# Patient Record
Sex: MALE | Employment: FULL TIME | URBAN - METROPOLITAN AREA
[De-identification: names, ages, dates, MRNs, and addresses within clinical notes are randomized per-mention and may not be internally consistent; named-entity substitution may affect disease eponyms.]

---

## 2024-04-04 ENCOUNTER — OFFICE VISIT (OUTPATIENT)
Dept: PRIMARY CARE | Facility: CLINIC | Age: 29
End: 2024-04-04
Payer: COMMERCIAL

## 2024-04-04 ENCOUNTER — HOSPITAL ENCOUNTER (OUTPATIENT)
Dept: RADIOLOGY | Facility: HOSPITAL | Age: 29
Discharge: HOME | End: 2024-04-04
Payer: COMMERCIAL

## 2024-04-04 VITALS
WEIGHT: 205 LBS | HEART RATE: 80 BPM | DIASTOLIC BLOOD PRESSURE: 100 MMHG | HEIGHT: 73 IN | BODY MASS INDEX: 27.17 KG/M2 | SYSTOLIC BLOOD PRESSURE: 130 MMHG

## 2024-04-04 DIAGNOSIS — Z76.89 ENCOUNTER TO ESTABLISH CARE: ICD-10-CM

## 2024-04-04 DIAGNOSIS — R06.02 SHORTNESS OF BREATH: Primary | ICD-10-CM

## 2024-04-04 DIAGNOSIS — R07.9 CHEST PAIN, UNSPECIFIED TYPE: ICD-10-CM

## 2024-04-04 DIAGNOSIS — R06.02 SHORTNESS OF BREATH: ICD-10-CM

## 2024-04-04 DIAGNOSIS — R03.0 ELEVATED BLOOD PRESSURE READING: ICD-10-CM

## 2024-04-04 DIAGNOSIS — R51.9 NONINTRACTABLE HEADACHE, UNSPECIFIED CHRONICITY PATTERN, UNSPECIFIED HEADACHE TYPE: ICD-10-CM

## 2024-04-04 PROCEDURE — 93000 ELECTROCARDIOGRAM COMPLETE: CPT | Performed by: CLINICAL NURSE SPECIALIST

## 2024-04-04 PROCEDURE — 99204 OFFICE O/P NEW MOD 45 MIN: CPT | Performed by: CLINICAL NURSE SPECIALIST

## 2024-04-04 PROCEDURE — 71046 X-RAY EXAM CHEST 2 VIEWS: CPT

## 2024-04-04 PROCEDURE — 71046 X-RAY EXAM CHEST 2 VIEWS: CPT | Performed by: RADIOLOGY

## 2024-04-04 PROCEDURE — 1036F TOBACCO NON-USER: CPT | Performed by: CLINICAL NURSE SPECIALIST

## 2024-04-04 ASSESSMENT — PATIENT HEALTH QUESTIONNAIRE - PHQ9
1. LITTLE INTEREST OR PLEASURE IN DOING THINGS: NOT AT ALL
SUM OF ALL RESPONSES TO PHQ9 QUESTIONS 1 AND 2: 0
2. FEELING DOWN, DEPRESSED OR HOPELESS: NOT AT ALL

## 2024-04-04 ASSESSMENT — ENCOUNTER SYMPTOMS
DEPRESSION: 0
EYE PAIN: 0
BACK PAIN: 0
OCCASIONAL FEELINGS OF UNSTEADINESS: 0
FEVER: 0
PHOTOPHOBIA: 0
BRUISES/BLEEDS EASILY: 0
UNEXPECTED WEIGHT CHANGE: 0
VOMITING: 0
CHILLS: 0
POLYDIPSIA: 0
WOUND: 0
PALPITATIONS: 1
FLANK PAIN: 0
WHEEZING: 0
APPETITE CHANGE: 0
DYSURIA: 0
NECK PAIN: 0
DIARRHEA: 0
JOINT SWELLING: 0
HEMATURIA: 0
SORE THROAT: 0
HEADACHES: 0
CHEST TIGHTNESS: 0
DIZZINESS: 0
ABDOMINAL PAIN: 0
ARTHRALGIAS: 0
MYALGIAS: 0
BLOOD IN STOOL: 0
FATIGUE: 0
ACTIVITY CHANGE: 0
CONFUSION: 0
COUGH: 0
LOSS OF SENSATION IN FEET: 0
SLEEP DISTURBANCE: 0
SHORTNESS OF BREATH: 1
NAUSEA: 0
SEIZURES: 0
TROUBLE SWALLOWING: 0
CONSTIPATION: 0

## 2024-04-04 ASSESSMENT — ANXIETY QUESTIONNAIRES
6. BECOMING EASILY ANNOYED OR IRRITABLE: NOT AT ALL
7. FEELING AFRAID AS IF SOMETHING AWFUL MIGHT HAPPEN: NOT AT ALL
GAD7 TOTAL SCORE: 9
3. WORRYING TOO MUCH ABOUT DIFFERENT THINGS: NOT AT ALL
5. BEING SO RESTLESS THAT IT IS HARD TO SIT STILL: NEARLY EVERY DAY
4. TROUBLE RELAXING: NOT AT ALL
1. FEELING NERVOUS, ANXIOUS, OR ON EDGE: NEARLY EVERY DAY
2. NOT BEING ABLE TO STOP OR CONTROL WORRYING: NEARLY EVERY DAY

## 2024-04-04 ASSESSMENT — COLUMBIA-SUICIDE SEVERITY RATING SCALE - C-SSRS
1. IN THE PAST MONTH, HAVE YOU WISHED YOU WERE DEAD OR WISHED YOU COULD GO TO SLEEP AND NOT WAKE UP?: NO
6. HAVE YOU EVER DONE ANYTHING, STARTED TO DO ANYTHING, OR PREPARED TO DO ANYTHING TO END YOUR LIFE?: NO
2. HAVE YOU ACTUALLY HAD ANY THOUGHTS OF KILLING YOURSELF?: NO

## 2024-04-04 NOTE — PROGRESS NOTES
Subjective   Patient ID: Garry Rivas is a 28 y.o. male who presents for Establish Care (New patient/Discuss chest pain on and off, pain left side off head, Dizziness, Shortness of breath ).  HPI    Patient presents as a new patient today today to establish care. Multiple complaints. Complaints of Chest pain that has been intermittent, Head pain, Dizziness and Shortness of breath. All symptoms come and go. Unable to identify any triggers. States that at times he can wake up with these symptoms. Palpitations intermittent. Denies any nausea/vomiting. Denies any recent infections. Denies any concerns for Anxiety.     Review of Systems   Constitutional:  Negative for activity change, appetite change, chills, fatigue, fever and unexpected weight change.   HENT:  Negative for ear pain, hearing loss, nosebleeds, sore throat, tinnitus and trouble swallowing.    Eyes:  Negative for photophobia, pain and visual disturbance.   Respiratory:  Positive for shortness of breath. Negative for cough, chest tightness and wheezing.    Cardiovascular:  Positive for chest pain and palpitations. Negative for leg swelling.   Gastrointestinal:  Negative for abdominal pain, blood in stool, constipation, diarrhea, nausea and vomiting.   Endocrine: Negative for cold intolerance, heat intolerance, polydipsia and polyuria.   Genitourinary:  Negative for dysuria, flank pain and hematuria.   Musculoskeletal:  Negative for arthralgias, back pain, joint swelling, myalgias and neck pain.   Skin:  Negative for pallor, rash and wound.   Allergic/Immunologic: Negative for immunocompromised state.   Neurological:  Negative for dizziness, seizures and headaches.   Hematological:  Does not bruise/bleed easily.   Psychiatric/Behavioral:  Negative for confusion and sleep disturbance.        Objective   Physical Exam  Vitals and nursing note reviewed.   Constitutional:       General: He is not in acute distress.     Appearance: Normal appearance.   HENT:       Head: Normocephalic.      Nose: Nose normal.   Eyes:      Conjunctiva/sclera: Conjunctivae normal.   Neck:      Vascular: No carotid bruit.   Cardiovascular:      Rate and Rhythm: Normal rate and regular rhythm.      Pulses: Normal pulses.      Heart sounds: Normal heart sounds.   Pulmonary:      Effort: Pulmonary effort is normal.      Breath sounds: Normal breath sounds.   Abdominal:      General: Bowel sounds are normal.      Palpations: Abdomen is soft.   Musculoskeletal:         General: Normal range of motion.      Cervical back: Normal range of motion.   Skin:     General: Skin is warm and dry.   Neurological:      Mental Status: He is alert and oriented to person, place, and time. Mental status is at baseline.   Psychiatric:         Mood and Affect: Mood normal.         Behavior: Behavior normal.         Assessment/Plan        Chest Pain, HA, Elevated BP, Shortness of breath: ECG done in the office today. Indicated NSR with HR controlled. Lab work and imaging ordered. Will follow up pending results. Discussed management of elevated blood pressure. Follow up pending results and in 1 month to reassess blood pressure.     LUZMA Harrington-CNS 04/04/24 12:08 PM

## 2024-04-05 ENCOUNTER — TELEPHONE (OUTPATIENT)
Dept: PRIMARY CARE | Facility: CLINIC | Age: 29
End: 2024-04-05
Payer: COMMERCIAL

## 2024-04-05 NOTE — TELEPHONE ENCOUNTER
----- Message from Paola Mcgregor, LUZMA-CNS sent at 4/5/2024  7:34 AM EDT -----  Please let patient know that his XR was clear. Will follow up after results of blood work. Thank you!

## 2024-04-06 ENCOUNTER — LAB (OUTPATIENT)
Dept: LAB | Facility: LAB | Age: 29
End: 2024-04-06
Payer: COMMERCIAL

## 2024-04-06 DIAGNOSIS — R51.9 NONINTRACTABLE HEADACHE, UNSPECIFIED CHRONICITY PATTERN, UNSPECIFIED HEADACHE TYPE: ICD-10-CM

## 2024-04-06 DIAGNOSIS — R07.9 CHEST PAIN, UNSPECIFIED TYPE: ICD-10-CM

## 2024-04-06 DIAGNOSIS — R06.02 SHORTNESS OF BREATH: ICD-10-CM

## 2024-04-06 DIAGNOSIS — R03.0 ELEVATED BLOOD PRESSURE READING: ICD-10-CM

## 2024-04-06 LAB
ALBUMIN SERPL BCP-MCNC: 4.7 G/DL (ref 3.4–5)
ALP SERPL-CCNC: 68 U/L (ref 33–120)
ALT SERPL W P-5'-P-CCNC: 46 U/L (ref 10–52)
ANION GAP SERPL CALC-SCNC: 13 MMOL/L (ref 10–20)
AST SERPL W P-5'-P-CCNC: 30 U/L (ref 9–39)
BILIRUB SERPL-MCNC: 0.8 MG/DL (ref 0–1.2)
BUN SERPL-MCNC: 15 MG/DL (ref 6–23)
CALCIUM SERPL-MCNC: 9.4 MG/DL (ref 8.6–10.3)
CHLORIDE SERPL-SCNC: 104 MMOL/L (ref 98–107)
CHOLEST SERPL-MCNC: 202 MG/DL (ref 0–199)
CHOLESTEROL/HDL RATIO: 2.9
CO2 SERPL-SCNC: 26 MMOL/L (ref 21–32)
CREAT SERPL-MCNC: 0.74 MG/DL (ref 0.5–1.3)
EGFRCR SERPLBLD CKD-EPI 2021: >90 ML/MIN/1.73M*2
ERYTHROCYTE [DISTWIDTH] IN BLOOD BY AUTOMATED COUNT: 12.4 % (ref 11.5–14.5)
GLUCOSE SERPL-MCNC: 95 MG/DL (ref 74–99)
HCT VFR BLD AUTO: 46.5 % (ref 41–52)
HDLC SERPL-MCNC: 69.8 MG/DL
HGB BLD-MCNC: 16.1 G/DL (ref 13.5–17.5)
LDLC SERPL CALC-MCNC: 120 MG/DL
MAGNESIUM SERPL-MCNC: 1.98 MG/DL (ref 1.6–2.4)
MCH RBC QN AUTO: 29.4 PG (ref 26–34)
MCHC RBC AUTO-ENTMCNC: 34.6 G/DL (ref 32–36)
MCV RBC AUTO: 85 FL (ref 80–100)
NON HDL CHOLESTEROL: 132 MG/DL (ref 0–149)
NRBC BLD-RTO: 0 /100 WBCS (ref 0–0)
PLATELET # BLD AUTO: 244 X10*3/UL (ref 150–450)
POTASSIUM SERPL-SCNC: 4.2 MMOL/L (ref 3.5–5.3)
PROT SERPL-MCNC: 6.9 G/DL (ref 6.4–8.2)
RBC # BLD AUTO: 5.47 X10*6/UL (ref 4.5–5.9)
SODIUM SERPL-SCNC: 139 MMOL/L (ref 136–145)
TRIGL SERPL-MCNC: 62 MG/DL (ref 0–149)
TSH SERPL-ACNC: 1.48 MIU/L (ref 0.44–3.98)
VIT B12 SERPL-MCNC: 397 PG/ML (ref 211–911)
VLDL: 12 MG/DL (ref 0–40)
WBC # BLD AUTO: 5.6 X10*3/UL (ref 4.4–11.3)

## 2024-04-06 PROCEDURE — 80061 LIPID PANEL: CPT

## 2024-04-06 PROCEDURE — 84443 ASSAY THYROID STIM HORMONE: CPT

## 2024-04-06 PROCEDURE — 83735 ASSAY OF MAGNESIUM: CPT

## 2024-04-06 PROCEDURE — 80053 COMPREHEN METABOLIC PANEL: CPT

## 2024-04-06 PROCEDURE — 36415 COLL VENOUS BLD VENIPUNCTURE: CPT

## 2024-04-06 PROCEDURE — 85027 COMPLETE CBC AUTOMATED: CPT

## 2024-04-06 PROCEDURE — 82607 VITAMIN B-12: CPT

## 2024-04-08 ENCOUNTER — TELEPHONE (OUTPATIENT)
Dept: PRIMARY CARE | Facility: CLINIC | Age: 29
End: 2024-04-08
Payer: COMMERCIAL

## 2024-04-08 NOTE — TELEPHONE ENCOUNTER
----- Message from LUZMA Harrington-CNS sent at 4/7/2024  9:53 PM EDT -----  Please call patient with lab results. Cholesterol is borderline. Remaining blood work is stable. Would keep follow up appointment and at that time can reevaluate his blood pressure and discuss Holter Monitor as planned. Thank you!

## 2024-05-07 ENCOUNTER — OFFICE VISIT (OUTPATIENT)
Dept: PRIMARY CARE | Facility: CLINIC | Age: 29
End: 2024-05-07
Payer: COMMERCIAL

## 2024-05-07 VITALS
WEIGHT: 205 LBS | DIASTOLIC BLOOD PRESSURE: 100 MMHG | HEART RATE: 89 BPM | HEIGHT: 73 IN | BODY MASS INDEX: 27.17 KG/M2 | SYSTOLIC BLOOD PRESSURE: 130 MMHG

## 2024-05-07 DIAGNOSIS — R07.9 CHEST PAIN, UNSPECIFIED TYPE: ICD-10-CM

## 2024-05-07 DIAGNOSIS — R03.0 ELEVATED BLOOD PRESSURE READING: ICD-10-CM

## 2024-05-07 DIAGNOSIS — I10 PRIMARY HYPERTENSION: Primary | ICD-10-CM

## 2024-05-07 DIAGNOSIS — R51.9 NONINTRACTABLE HEADACHE, UNSPECIFIED CHRONICITY PATTERN, UNSPECIFIED HEADACHE TYPE: ICD-10-CM

## 2024-05-07 DIAGNOSIS — G47.10 HYPERSOMNIA: ICD-10-CM

## 2024-05-07 DIAGNOSIS — E78.2 MIXED HYPERLIPIDEMIA: ICD-10-CM

## 2024-05-07 DIAGNOSIS — R06.02 SHORTNESS OF BREATH: ICD-10-CM

## 2024-05-07 DIAGNOSIS — R06.83 SNORING: ICD-10-CM

## 2024-05-07 PROBLEM — E78.5 HYPERLIPIDEMIA: Status: ACTIVE | Noted: 2024-05-07

## 2024-05-07 PROCEDURE — 99214 OFFICE O/P EST MOD 30 MIN: CPT | Performed by: CLINICAL NURSE SPECIALIST

## 2024-05-07 PROCEDURE — 1036F TOBACCO NON-USER: CPT | Performed by: CLINICAL NURSE SPECIALIST

## 2024-05-07 PROCEDURE — 3080F DIAST BP >= 90 MM HG: CPT | Performed by: CLINICAL NURSE SPECIALIST

## 2024-05-07 PROCEDURE — 3075F SYST BP GE 130 - 139MM HG: CPT | Performed by: CLINICAL NURSE SPECIALIST

## 2024-05-07 RX ORDER — LISINOPRIL 10 MG/1
10 TABLET ORAL DAILY
Qty: 100 TABLET | Refills: 3 | Status: SHIPPED | OUTPATIENT
Start: 2024-05-07 | End: 2025-06-11

## 2024-05-07 ASSESSMENT — ENCOUNTER SYMPTOMS
BACK PAIN: 0
ARTHRALGIAS: 0
DIZZINESS: 0
CONFUSION: 0
FEVER: 0
ACTIVITY CHANGE: 0
BLOOD IN STOOL: 0
SORE THROAT: 0
TROUBLE SWALLOWING: 0
BRUISES/BLEEDS EASILY: 0
MYALGIAS: 0
APPETITE CHANGE: 0
NECK PAIN: 0
CHILLS: 0
ABDOMINAL PAIN: 0
FLANK PAIN: 0
EYE PAIN: 0
VOMITING: 0
SLEEP DISTURBANCE: 1
DEPRESSION: 0
PHOTOPHOBIA: 0
SHORTNESS OF BREATH: 0
WOUND: 0
LOSS OF SENSATION IN FEET: 0
DYSURIA: 0
FATIGUE: 1
CHEST TIGHTNESS: 0
DIARRHEA: 0
POLYDIPSIA: 0
JOINT SWELLING: 0
UNEXPECTED WEIGHT CHANGE: 0
OCCASIONAL FEELINGS OF UNSTEADINESS: 0
WHEEZING: 0
SEIZURES: 0
CONSTIPATION: 0
COUGH: 0
HEADACHES: 0
NAUSEA: 0
HEMATURIA: 0
PALPITATIONS: 0

## 2024-05-07 ASSESSMENT — COLUMBIA-SUICIDE SEVERITY RATING SCALE - C-SSRS
1. IN THE PAST MONTH, HAVE YOU WISHED YOU WERE DEAD OR WISHED YOU COULD GO TO SLEEP AND NOT WAKE UP?: NO
2. HAVE YOU ACTUALLY HAD ANY THOUGHTS OF KILLING YOURSELF?: NO
6. HAVE YOU EVER DONE ANYTHING, STARTED TO DO ANYTHING, OR PREPARED TO DO ANYTHING TO END YOUR LIFE?: NO

## 2024-05-07 NOTE — PROGRESS NOTES
Subjective   Patient ID: Garry Rivas is a 29 y.o. male who presents for Follow-up (Follow up blood pressure ).  HPI    Patient presents as a follow up appointment to discuss his blood pressure. Complaints of Chest pain that have been intermittent, Head pain, Dizziness and Shortness of breath. All symptoms come and go. Unable to identify any triggers. States that at times he can wake up with these symptoms. Palpitations intermittent. Denies any nausea/vomiting. Denies any recent infections. Denies any concerns for Anxiety.     States that since last OV some symptoms have started to improve. Has been able to be more active, Mountain Biking and Skateboarding. Tolerating physical activity. Symptoms are not as severe. Does think that he has some element of Anxiety contributing to his symptoms.     Blood pressure has remained elevated. Lab work and Imaging completed since last visit.     Has also been having increased Snoring. Doesn't feel rested at night. Constantly needs to turn on his side. Significant Other kicks him to roll over. Increased napping. Feels tired/fatigued.     Review of Systems   Constitutional:  Positive for fatigue. Negative for activity change, appetite change, chills, fever and unexpected weight change.   HENT:  Negative for ear pain, hearing loss, nosebleeds, sore throat, tinnitus and trouble swallowing.    Eyes:  Negative for photophobia, pain and visual disturbance.   Respiratory:  Negative for cough, chest tightness, shortness of breath and wheezing.    Cardiovascular:  Negative for chest pain, palpitations and leg swelling.   Gastrointestinal:  Negative for abdominal pain, blood in stool, constipation, diarrhea, nausea and vomiting.   Endocrine: Negative for cold intolerance, heat intolerance, polydipsia and polyuria.   Genitourinary:  Negative for dysuria, flank pain and hematuria.   Musculoskeletal:  Negative for arthralgias, back pain, joint swelling, myalgias and neck pain.   Skin:   Negative for pallor, rash and wound.   Allergic/Immunologic: Negative for immunocompromised state.   Neurological:  Negative for dizziness, seizures and headaches.   Hematological:  Does not bruise/bleed easily.   Psychiatric/Behavioral:  Positive for sleep disturbance. Negative for confusion.        Objective   Physical Exam  Vitals and nursing note reviewed.   Constitutional:       General: He is not in acute distress.     Appearance: Normal appearance.   HENT:      Head: Normocephalic.      Nose: Nose normal.   Eyes:      Conjunctiva/sclera: Conjunctivae normal.   Neck:      Vascular: No carotid bruit.   Cardiovascular:      Rate and Rhythm: Normal rate and regular rhythm.      Pulses: Normal pulses.      Heart sounds: Normal heart sounds.   Pulmonary:      Effort: Pulmonary effort is normal.      Breath sounds: Normal breath sounds.   Abdominal:      General: Bowel sounds are normal.      Palpations: Abdomen is soft.   Musculoskeletal:         General: Normal range of motion.      Cervical back: Normal range of motion.   Skin:     General: Skin is warm and dry.   Neurological:      Mental Status: He is alert and oriented to person, place, and time. Mental status is at baseline.   Psychiatric:         Mood and Affect: Mood normal.         Behavior: Behavior normal.         Assessment/Plan       Reviewed results of blood work completed with patient.      Chest Pain, HA, Shortness of breath: ECG done in the office at last OV Indicated NSR with HR controlled. Lab work and imaging completed.   Hypertension: Start Lisinopril. Follow up with medication effectiveness in 1-2 months.   Snoring, Hypersomnia: HSAT ordered. Will follow up pending results.   Hyperlipidemia: Borderline. Lifestyle changes recommended: Diet consisting of low fat foods, lean meats, high fiber, fresh fruits and vegetables. 150 min/ weekly aerobic exercise.     LUZMA Harrington-CNS 05/07/24 10:17 AM

## 2024-06-04 ENCOUNTER — CLINICAL SUPPORT (OUTPATIENT)
Dept: SLEEP MEDICINE | Facility: HOSPITAL | Age: 29
End: 2024-06-04
Payer: COMMERCIAL

## 2024-06-04 DIAGNOSIS — R06.83 SNORING: ICD-10-CM

## 2024-06-04 DIAGNOSIS — G47.10 HYPERSOMNIA: ICD-10-CM

## 2024-06-04 PROCEDURE — 95806 SLEEP STUDY UNATT&RESP EFFT: CPT | Performed by: INTERNAL MEDICINE

## 2024-06-19 ENCOUNTER — TELEPHONE (OUTPATIENT)
Dept: PRIMARY CARE | Facility: CLINIC | Age: 29
End: 2024-06-19
Payer: COMMERCIAL

## 2024-06-19 NOTE — TELEPHONE ENCOUNTER
----- Message from LUZMA Harrington-CNS sent at 6/18/2024  6:00 PM EDT -----  Please let patient know that Sleep Study indicates Severe Apnea. If agreeable, treatment would be Pap therapy. Or can do a dedicated Sleep Study for Titration. If agreeable, will place the order. Thank you!

## 2024-06-20 NOTE — TELEPHONE ENCOUNTER
He called back I went over the message he stated that he has an apt in 1 week with Paola and he would like to sit and talk with her about this

## 2024-06-26 ENCOUNTER — APPOINTMENT (OUTPATIENT)
Dept: PRIMARY CARE | Facility: CLINIC | Age: 29
End: 2024-06-26
Payer: COMMERCIAL

## 2024-06-26 VITALS
HEART RATE: 88 BPM | HEIGHT: 73 IN | SYSTOLIC BLOOD PRESSURE: 120 MMHG | DIASTOLIC BLOOD PRESSURE: 82 MMHG | BODY MASS INDEX: 26.64 KG/M2 | WEIGHT: 201 LBS

## 2024-06-26 DIAGNOSIS — R06.02 SHORTNESS OF BREATH: ICD-10-CM

## 2024-06-26 DIAGNOSIS — R06.83 SNORING: ICD-10-CM

## 2024-06-26 DIAGNOSIS — I10 PRIMARY HYPERTENSION: ICD-10-CM

## 2024-06-26 DIAGNOSIS — G47.10 HYPERSOMNIA: ICD-10-CM

## 2024-06-26 DIAGNOSIS — G47.33 SEVERE OBSTRUCTIVE SLEEP APNEA: Primary | ICD-10-CM

## 2024-06-26 DIAGNOSIS — E78.2 MIXED HYPERLIPIDEMIA: ICD-10-CM

## 2024-06-26 DIAGNOSIS — R03.0 ELEVATED BLOOD PRESSURE READING: ICD-10-CM

## 2024-06-26 DIAGNOSIS — R07.9 CHEST PAIN, UNSPECIFIED TYPE: ICD-10-CM

## 2024-06-26 DIAGNOSIS — R51.9 NONINTRACTABLE HEADACHE, UNSPECIFIED CHRONICITY PATTERN, UNSPECIFIED HEADACHE TYPE: ICD-10-CM

## 2024-06-26 PROCEDURE — 3074F SYST BP LT 130 MM HG: CPT | Performed by: CLINICAL NURSE SPECIALIST

## 2024-06-26 PROCEDURE — 1036F TOBACCO NON-USER: CPT | Performed by: CLINICAL NURSE SPECIALIST

## 2024-06-26 PROCEDURE — 99214 OFFICE O/P EST MOD 30 MIN: CPT | Performed by: CLINICAL NURSE SPECIALIST

## 2024-06-26 PROCEDURE — 3079F DIAST BP 80-89 MM HG: CPT | Performed by: CLINICAL NURSE SPECIALIST

## 2024-06-26 ASSESSMENT — ENCOUNTER SYMPTOMS
HEMATURIA: 0
SHORTNESS OF BREATH: 0
ARTHRALGIAS: 0
LOSS OF SENSATION IN FEET: 0
FLANK PAIN: 0
NECK PAIN: 0
CONSTIPATION: 0
PALPITATIONS: 0
WOUND: 0
BRUISES/BLEEDS EASILY: 0
DYSURIA: 0
VOMITING: 0
EYE PAIN: 0
TROUBLE SWALLOWING: 0
DEPRESSION: 0
BLOOD IN STOOL: 0
COUGH: 0
POLYDIPSIA: 0
NAUSEA: 0
FEVER: 0
ABDOMINAL PAIN: 0
UNEXPECTED WEIGHT CHANGE: 0
WHEEZING: 0
MYALGIAS: 0
PHOTOPHOBIA: 0
CONFUSION: 0
FATIGUE: 0
DIZZINESS: 0
CHEST TIGHTNESS: 0
APPETITE CHANGE: 0
HEADACHES: 0
SEIZURES: 0
OCCASIONAL FEELINGS OF UNSTEADINESS: 0
SORE THROAT: 0
ACTIVITY CHANGE: 0
BACK PAIN: 0
SLEEP DISTURBANCE: 0
DIARRHEA: 0
JOINT SWELLING: 0
CHILLS: 0

## 2024-06-26 ASSESSMENT — PATIENT HEALTH QUESTIONNAIRE - PHQ9
2. FEELING DOWN, DEPRESSED OR HOPELESS: NOT AT ALL
1. LITTLE INTEREST OR PLEASURE IN DOING THINGS: NOT AT ALL
SUM OF ALL RESPONSES TO PHQ9 QUESTIONS 1 AND 2: 0

## 2024-06-26 NOTE — PROGRESS NOTES
Subjective   Patient ID: Garry Rivas is a 29 y.o. male who presents for Annual Exam (Wellness exam/Discuss bp running high).  HPI    Patient presents as a follow up appointment to discuss his blood pressure. Complaints of Chest pain that have been intermittent, Head pain, Dizziness and Shortness of breath. All symptoms come and go. Unable to identify any triggers. States that at times he can wake up with these symptoms. Palpitations intermittent. Denies any nausea/vomiting. Denies any recent infections. Denies any concerns for Anxiety.      States that since last OV some symptoms have started to improve. Has been able to be more active, Mountain Biking and Skateboarding. Tolerating physical activity. Symptoms are not as severe. Does think that he has some element of Anxiety contributing to his symptoms.      Has also been having increased Snoring. Doesn't feel rested at night. Constantly needs to turn on his side. Significant Other kicks him to roll over. Increased napping. Feels tired/fatigued. Sleep study completed.     Has continued on Lisinopril for his Hypertension.     Review of Systems   Constitutional:  Negative for activity change, appetite change, chills, fatigue, fever and unexpected weight change.   HENT:  Negative for ear pain, hearing loss, nosebleeds, sore throat, tinnitus and trouble swallowing.    Eyes:  Negative for photophobia, pain and visual disturbance.   Respiratory:  Negative for cough, chest tightness, shortness of breath and wheezing.    Cardiovascular:  Negative for chest pain, palpitations and leg swelling.   Gastrointestinal:  Negative for abdominal pain, blood in stool, constipation, diarrhea, nausea and vomiting.   Endocrine: Negative for cold intolerance, heat intolerance, polydipsia and polyuria.   Genitourinary:  Negative for dysuria, flank pain and hematuria.   Musculoskeletal:  Negative for arthralgias, back pain, joint swelling, myalgias and neck pain.   Skin:  Negative for  pallor, rash and wound.   Allergic/Immunologic: Negative for immunocompromised state.   Neurological:  Negative for dizziness, seizures and headaches.   Hematological:  Does not bruise/bleed easily.   Psychiatric/Behavioral:  Negative for confusion and sleep disturbance.        Objective   Physical Exam  Vitals and nursing note reviewed.   Constitutional:       General: He is not in acute distress.     Appearance: Normal appearance.   HENT:      Head: Normocephalic.      Nose: Nose normal.   Eyes:      Conjunctiva/sclera: Conjunctivae normal.   Neck:      Vascular: No carotid bruit.   Cardiovascular:      Rate and Rhythm: Normal rate and regular rhythm.      Pulses: Normal pulses.      Heart sounds: Normal heart sounds.   Pulmonary:      Effort: Pulmonary effort is normal.      Breath sounds: Normal breath sounds.   Abdominal:      General: Bowel sounds are normal.      Palpations: Abdomen is soft.   Musculoskeletal:         General: Normal range of motion.      Cervical back: Normal range of motion.   Skin:     General: Skin is warm and dry.   Neurological:      Mental Status: He is alert and oriented to person, place, and time. Mental status is at baseline.   Psychiatric:         Mood and Affect: Mood normal.         Behavior: Behavior normal.       Assessment/Plan        Reviewed results of blood work completed with patient at last OV.       Chest Pain, HA, Shortness of breath: ECG done in the office at last OV Indicated NSR with HR controlled. Lab work and imaging completed.   Hypertension: Continue Lisinopril.   Apnea: HSAT indicated Severe Apnea.  Discussed treatment management.  Sleep Medicine referral.   Hyperlipidemia: Borderline. Lifestyle changes recommended: Diet consisting of low fat foods, lean meats, high fiber, fresh fruits and vegetables. 150 min/ weekly aerobic exercise. Reevaluate with next lab work.     LUZMA Harrington-CNS 06/26/24 3:10 PM

## 2024-07-02 ENCOUNTER — TELEPHONE (OUTPATIENT)
Dept: PRIMARY CARE | Facility: CLINIC | Age: 29
End: 2024-07-02
Payer: COMMERCIAL

## 2024-07-02 DIAGNOSIS — G47.33 SEVERE OBSTRUCTIVE SLEEP APNEA: Primary | ICD-10-CM

## 2024-07-02 NOTE — TELEPHONE ENCOUNTER
Patient says that he has not be seen by sleep medicine  until late September. Would like the CPAP ordered

## 2024-07-09 ENCOUNTER — APPOINTMENT (OUTPATIENT)
Dept: PRIMARY CARE | Facility: CLINIC | Age: 29
End: 2024-07-09
Payer: COMMERCIAL

## 2024-07-25 ENCOUNTER — TELEPHONE (OUTPATIENT)
Dept: PRIMARY CARE | Facility: CLINIC | Age: 29
End: 2024-07-25
Payer: COMMERCIAL

## 2024-07-25 DIAGNOSIS — I10 PRIMARY HYPERTENSION: ICD-10-CM

## 2024-07-25 RX ORDER — LISINOPRIL 10 MG/1
10 TABLET ORAL DAILY
Qty: 90 TABLET | Refills: 3 | Status: SHIPPED | OUTPATIENT
Start: 2024-07-25 | End: 2025-07-20

## 2024-09-26 ENCOUNTER — APPOINTMENT (OUTPATIENT)
Dept: SLEEP MEDICINE | Facility: CLINIC | Age: 29
End: 2024-09-26
Payer: COMMERCIAL

## 2024-12-02 ENCOUNTER — TELEPHONE (OUTPATIENT)
Dept: PRIMARY CARE | Facility: CLINIC | Age: 29
End: 2024-12-02
Payer: COMMERCIAL

## 2024-12-02 NOTE — TELEPHONE ENCOUNTER
Patient was at the  ED and was told he had a herpes infection and that he needs to have labs ordered. Did not specify what labs.

## 2024-12-09 ENCOUNTER — APPOINTMENT (OUTPATIENT)
Dept: PRIMARY CARE | Facility: CLINIC | Age: 29
End: 2024-12-09
Payer: COMMERCIAL

## 2024-12-09 VITALS
HEIGHT: 72 IN | WEIGHT: 200 LBS | BODY MASS INDEX: 27.09 KG/M2 | DIASTOLIC BLOOD PRESSURE: 82 MMHG | OXYGEN SATURATION: 98 % | HEART RATE: 90 BPM | SYSTOLIC BLOOD PRESSURE: 116 MMHG

## 2024-12-09 DIAGNOSIS — B00.9 HSV INFECTION: Primary | ICD-10-CM

## 2024-12-09 PROCEDURE — 99213 OFFICE O/P EST LOW 20 MIN: CPT

## 2024-12-09 PROCEDURE — 1036F TOBACCO NON-USER: CPT

## 2024-12-09 PROCEDURE — 3008F BODY MASS INDEX DOCD: CPT

## 2024-12-09 RX ORDER — VALACYCLOVIR HYDROCHLORIDE 1 G/1
1 TABLET, FILM COATED ORAL
COMMUNITY
Start: 2024-11-25 | End: 2024-12-09 | Stop reason: SDUPTHER

## 2024-12-09 RX ORDER — VALACYCLOVIR HYDROCHLORIDE 1 G/1
1000 TABLET, FILM COATED ORAL 3 TIMES DAILY
Qty: 21 TABLET | Refills: 1 | Status: SHIPPED | OUTPATIENT
Start: 2024-12-09 | End: 2024-12-16

## 2024-12-09 ASSESSMENT — COLUMBIA-SUICIDE SEVERITY RATING SCALE - C-SSRS
6. HAVE YOU EVER DONE ANYTHING, STARTED TO DO ANYTHING, OR PREPARED TO DO ANYTHING TO END YOUR LIFE?: NO
1. IN THE PAST MONTH, HAVE YOU WISHED YOU WERE DEAD OR WISHED YOU COULD GO TO SLEEP AND NOT WAKE UP?: NO
2. HAVE YOU ACTUALLY HAD ANY THOUGHTS OF KILLING YOURSELF?: NO

## 2024-12-09 ASSESSMENT — ENCOUNTER SYMPTOMS
LOSS OF SENSATION IN FEET: 0
OCCASIONAL FEELINGS OF UNSTEADINESS: 0
DEPRESSION: 0

## 2024-12-09 NOTE — PROGRESS NOTES
Subjective   Patient ID: Garry Rivas is a 29 y.o. male who presents for Follow-up (Pt here for ED follow up).    HPI a 29-year-old male arrives to clinic with chief complaint of ER follow-up.  The patient was seen and treated at a local emergency department November 25, 2024 for mouth lesions.  Patient was given valacyclovir and viscous lidocaine in which his symptoms went away other than 1 spot left under his top right canine.  He is here for evaluation.    Review of Systems  Mild sore  Objective   /82 (BP Location: Right arm, Patient Position: Sitting)   Pulse 90   Ht 1.829 m (6')   Wt 90.7 kg (200 lb)   SpO2 98%   BMI 27.12 kg/m²     Physical Exam  1 HSV lesion to the top right canine  Assessment/Plan   Problem List Items Addressed This Visit    None  Visit Diagnoses       HSV infection    -  Primary    Relevant Medications    valACYclovir (Valtrex) 1 gram tablet          HSV noted to his right upper top part of his mouth.  Valacyclovir sent with a refill.  HSV education was given in the office.  Follow-up with primary care provider.    As a result of the work-up, the patient was discharged home.  he was informed of his diagnosis and instructed to come back with any concerns or worsening of condition.  he and was agreeable to the plan as discussed above.  he was given the opportunity to ask questions.  All of the patient's questions were answered.    This document was generated using the assistance of voice recognition software. If there are any errors of spelling, grammar, syntax, or meaning; please feel free to contact me directly for clarification.

## 2024-12-28 ENCOUNTER — LAB (OUTPATIENT)
Dept: LAB | Facility: LAB | Age: 29
End: 2024-12-28
Payer: COMMERCIAL

## 2024-12-28 DIAGNOSIS — R06.02 SHORTNESS OF BREATH: ICD-10-CM

## 2024-12-28 DIAGNOSIS — R07.9 CHEST PAIN, UNSPECIFIED TYPE: ICD-10-CM

## 2024-12-28 DIAGNOSIS — R06.83 SNORING: ICD-10-CM

## 2024-12-28 DIAGNOSIS — G47.33 SEVERE OBSTRUCTIVE SLEEP APNEA: ICD-10-CM

## 2024-12-28 DIAGNOSIS — I10 PRIMARY HYPERTENSION: ICD-10-CM

## 2024-12-28 DIAGNOSIS — R03.0 ELEVATED BLOOD PRESSURE READING: ICD-10-CM

## 2024-12-28 DIAGNOSIS — E78.2 MIXED HYPERLIPIDEMIA: ICD-10-CM

## 2024-12-28 DIAGNOSIS — G47.10 HYPERSOMNIA: ICD-10-CM

## 2024-12-28 DIAGNOSIS — R51.9 NONINTRACTABLE HEADACHE, UNSPECIFIED CHRONICITY PATTERN, UNSPECIFIED HEADACHE TYPE: ICD-10-CM

## 2024-12-28 LAB
ALBUMIN SERPL BCP-MCNC: 4.5 G/DL (ref 3.4–5)
ALP SERPL-CCNC: 68 U/L (ref 33–120)
ALT SERPL W P-5'-P-CCNC: 46 U/L (ref 10–52)
ANION GAP SERPL CALC-SCNC: 9 MMOL/L (ref 10–20)
AST SERPL W P-5'-P-CCNC: 25 U/L (ref 9–39)
BILIRUB SERPL-MCNC: 0.6 MG/DL (ref 0–1.2)
BUN SERPL-MCNC: 14 MG/DL (ref 6–23)
CALCIUM SERPL-MCNC: 9.7 MG/DL (ref 8.6–10.3)
CHLORIDE SERPL-SCNC: 101 MMOL/L (ref 98–107)
CHOLEST SERPL-MCNC: 186 MG/DL (ref 0–199)
CHOLESTEROL/HDL RATIO: 2.9
CO2 SERPL-SCNC: 32 MMOL/L (ref 21–32)
CREAT SERPL-MCNC: 0.78 MG/DL (ref 0.5–1.3)
EGFRCR SERPLBLD CKD-EPI 2021: >90 ML/MIN/1.73M*2
ERYTHROCYTE [DISTWIDTH] IN BLOOD BY AUTOMATED COUNT: 12.5 % (ref 11.5–14.5)
GLUCOSE SERPL-MCNC: 103 MG/DL (ref 74–99)
HCT VFR BLD AUTO: 45.3 % (ref 41–52)
HDLC SERPL-MCNC: 64.7 MG/DL
HGB BLD-MCNC: 15.1 G/DL (ref 13.5–17.5)
LDLC SERPL CALC-MCNC: 106 MG/DL
MCH RBC QN AUTO: 29.1 PG (ref 26–34)
MCHC RBC AUTO-ENTMCNC: 33.3 G/DL (ref 32–36)
MCV RBC AUTO: 87 FL (ref 80–100)
NON HDL CHOLESTEROL: 121 MG/DL (ref 0–149)
NRBC BLD-RTO: 0 /100 WBCS (ref 0–0)
PLATELET # BLD AUTO: 276 X10*3/UL (ref 150–450)
POTASSIUM SERPL-SCNC: 4.3 MMOL/L (ref 3.5–5.3)
PROT SERPL-MCNC: 6.9 G/DL (ref 6.4–8.2)
RBC # BLD AUTO: 5.19 X10*6/UL (ref 4.5–5.9)
SODIUM SERPL-SCNC: 138 MMOL/L (ref 136–145)
TRIGL SERPL-MCNC: 78 MG/DL (ref 0–149)
TSH SERPL-ACNC: 1.42 MIU/L (ref 0.44–3.98)
VIT B12 SERPL-MCNC: 524 PG/ML (ref 211–911)
VLDL: 16 MG/DL (ref 0–40)
WBC # BLD AUTO: 6.3 X10*3/UL (ref 4.4–11.3)

## 2024-12-28 PROCEDURE — 84443 ASSAY THYROID STIM HORMONE: CPT

## 2024-12-28 PROCEDURE — 82607 VITAMIN B-12: CPT

## 2024-12-28 PROCEDURE — 85027 COMPLETE CBC AUTOMATED: CPT

## 2024-12-28 PROCEDURE — 80061 LIPID PANEL: CPT

## 2024-12-28 PROCEDURE — 80053 COMPREHEN METABOLIC PANEL: CPT

## 2024-12-31 ENCOUNTER — APPOINTMENT (OUTPATIENT)
Dept: PRIMARY CARE | Facility: CLINIC | Age: 29
End: 2024-12-31
Payer: COMMERCIAL

## 2024-12-31 VITALS
BODY MASS INDEX: 27.58 KG/M2 | WEIGHT: 203.6 LBS | HEART RATE: 89 BPM | HEIGHT: 72 IN | OXYGEN SATURATION: 97 % | SYSTOLIC BLOOD PRESSURE: 130 MMHG | DIASTOLIC BLOOD PRESSURE: 84 MMHG

## 2024-12-31 DIAGNOSIS — R51.9 NONINTRACTABLE HEADACHE, UNSPECIFIED CHRONICITY PATTERN, UNSPECIFIED HEADACHE TYPE: ICD-10-CM

## 2024-12-31 DIAGNOSIS — R06.02 SHORTNESS OF BREATH: ICD-10-CM

## 2024-12-31 DIAGNOSIS — Z00.00 HEALTHCARE MAINTENANCE: Primary | ICD-10-CM

## 2024-12-31 DIAGNOSIS — I10 PRIMARY HYPERTENSION: ICD-10-CM

## 2024-12-31 DIAGNOSIS — E78.2 MIXED HYPERLIPIDEMIA: ICD-10-CM

## 2024-12-31 DIAGNOSIS — G47.33 SEVERE OBSTRUCTIVE SLEEP APNEA: ICD-10-CM

## 2024-12-31 DIAGNOSIS — R03.0 ELEVATED BLOOD PRESSURE READING: ICD-10-CM

## 2024-12-31 DIAGNOSIS — R06.83 SNORING: ICD-10-CM

## 2024-12-31 DIAGNOSIS — R07.9 CHEST PAIN, UNSPECIFIED TYPE: ICD-10-CM

## 2024-12-31 DIAGNOSIS — G47.10 HYPERSOMNIA: ICD-10-CM

## 2024-12-31 PROCEDURE — 99395 PREV VISIT EST AGE 18-39: CPT | Performed by: CLINICAL NURSE SPECIALIST

## 2024-12-31 PROCEDURE — 3008F BODY MASS INDEX DOCD: CPT | Performed by: CLINICAL NURSE SPECIALIST

## 2024-12-31 PROCEDURE — 3079F DIAST BP 80-89 MM HG: CPT | Performed by: CLINICAL NURSE SPECIALIST

## 2024-12-31 PROCEDURE — 3075F SYST BP GE 130 - 139MM HG: CPT | Performed by: CLINICAL NURSE SPECIALIST

## 2024-12-31 PROCEDURE — 1036F TOBACCO NON-USER: CPT | Performed by: CLINICAL NURSE SPECIALIST

## 2024-12-31 RX ORDER — LISINOPRIL 10 MG/1
10 TABLET ORAL DAILY
Qty: 90 TABLET | Refills: 3 | Status: SHIPPED | OUTPATIENT
Start: 2024-12-31 | End: 2025-12-26

## 2024-12-31 ASSESSMENT — ENCOUNTER SYMPTOMS
NECK PAIN: 0
JOINT SWELLING: 0
SHORTNESS OF BREATH: 0
SEIZURES: 0
FLANK PAIN: 0
ARTHRALGIAS: 0
WOUND: 0
EYE PAIN: 0
UNEXPECTED WEIGHT CHANGE: 0
DIARRHEA: 0
DIZZINESS: 0
COUGH: 0
CONFUSION: 0
TROUBLE SWALLOWING: 0
VOMITING: 0
CHILLS: 0
POLYDIPSIA: 0
BACK PAIN: 0
FEVER: 0
DYSURIA: 0
BRUISES/BLEEDS EASILY: 0
NAUSEA: 0
HEADACHES: 0
ACTIVITY CHANGE: 0
SORE THROAT: 0
HEMATURIA: 0
MYALGIAS: 0
CONSTIPATION: 0
PHOTOPHOBIA: 0
SLEEP DISTURBANCE: 0
ABDOMINAL PAIN: 0
WHEEZING: 0
CHEST TIGHTNESS: 0
FATIGUE: 0
APPETITE CHANGE: 0
BLOOD IN STOOL: 0
PALPITATIONS: 0

## 2024-12-31 ASSESSMENT — PATIENT HEALTH QUESTIONNAIRE - PHQ9
1. LITTLE INTEREST OR PLEASURE IN DOING THINGS: NOT AT ALL
2. FEELING DOWN, DEPRESSED OR HOPELESS: NOT AT ALL
SUM OF ALL RESPONSES TO PHQ9 QUESTIONS 1 AND 2: 0

## 2024-12-31 NOTE — PROGRESS NOTES
Subjective   Patient ID: Garry Rivas is a 29 y.o. male who presents for Annual Exam (Wellness exam).  HPI    Patient presents as a follow up appointment to discuss his blood pressure. Wellness exam. Complaints of Chest pain that have been intermittent, Head pain, Dizziness and Shortness of breath. All symptoms come and go. Unable to identify any triggers. States that at times he can wake up with these symptoms. Palpitations intermittent. Denies any nausea/vomiting. Denies any recent infections. Denies any concerns for Anxiety.      States that since last OV some symptoms have started to improve. Has been able to be more active, Mountain Biking and Skateboarding. Tolerating physical activity. Symptoms are not as severe. Does think that he has some element of Anxiety contributing to his symptoms.      Has also been having increased Snoring. Doesn't feel rested at night. Constantly needs to turn on his side. Significant Other kicks him to roll over. Increased napping. Feels tired/fatigued. Sleep study completed.      Has continued on Lisinopril for his Hypertension. Symptoms have been well controlled since last visit.     Due for a Dental follow up.     Review of Systems   Constitutional:  Negative for activity change, appetite change, chills, fatigue, fever and unexpected weight change.   HENT:  Negative for ear pain, hearing loss, nosebleeds, sore throat, tinnitus and trouble swallowing.    Eyes:  Negative for photophobia, pain and visual disturbance.   Respiratory:  Negative for cough, chest tightness, shortness of breath and wheezing.    Cardiovascular:  Negative for chest pain, palpitations and leg swelling.   Gastrointestinal:  Negative for abdominal pain, blood in stool, constipation, diarrhea, nausea and vomiting.   Endocrine: Negative for cold intolerance, heat intolerance, polydipsia and polyuria.   Genitourinary:  Negative for dysuria, flank pain and hematuria.   Musculoskeletal:  Negative for  arthralgias, back pain, joint swelling, myalgias and neck pain.   Skin:  Negative for pallor, rash and wound.   Allergic/Immunologic: Negative for immunocompromised state.   Neurological:  Negative for dizziness, seizures and headaches.   Hematological:  Does not bruise/bleed easily.   Psychiatric/Behavioral:  Negative for confusion and sleep disturbance.        Objective   Physical Exam  Vitals and nursing note reviewed.   Constitutional:       General: He is not in acute distress.     Appearance: Normal appearance.   HENT:      Head: Normocephalic.      Nose: Nose normal.   Eyes:      Conjunctiva/sclera: Conjunctivae normal.   Neck:      Vascular: No carotid bruit.   Cardiovascular:      Rate and Rhythm: Normal rate and regular rhythm.      Pulses: Normal pulses.      Heart sounds: Normal heart sounds.   Pulmonary:      Effort: Pulmonary effort is normal.      Breath sounds: Normal breath sounds.   Abdominal:      General: Bowel sounds are normal.      Palpations: Abdomen is soft.   Musculoskeletal:         General: Normal range of motion.      Cervical back: Normal range of motion.   Skin:     General: Skin is warm and dry.   Neurological:      Mental Status: He is alert and oriented to person, place, and time. Mental status is at baseline.   Psychiatric:         Mood and Affect: Mood normal.         Behavior: Behavior normal.       Assessment/Plan        Reviewed results of blood work completed with patient.       Chest Pain, HA, Shortness of breath: ECG done in the office at last OV Indicated NSR with HR controlled. Lab work and imaging completed.   Hypertension: Continue Lisinopril.   Apnea: HSAT indicated Severe Apnea.  Discussed treatment management.  Sleep Medicine referral. Scheduled for January.   Hyperlipidemia: Borderline. Lifestyle changes recommended: Diet consisting of low fat foods, lean meats, high fiber, fresh fruits and vegetables. 150 min/ weekly aerobic exercise. Reevaluate with next lab work.    Wellness: Routine and age appropriate recommendations discussed with the patient today and patient verbalized understanding of the recommendations.  Questions answered.  Age appropriate immunizations and preventative screenings discussed with the patient and ordered as appropriate. Labs updated and ordered as indicated. Recommend healthy diet and daily exercise to maintain healthy body weight.      Declined updated immunizations.   Paola Mcgregor, LUZMA-CNS 12/31/24 11:00 AM

## 2025-01-09 ENCOUNTER — OFFICE VISIT (OUTPATIENT)
Dept: SLEEP MEDICINE | Facility: CLINIC | Age: 30
End: 2025-01-09
Payer: COMMERCIAL

## 2025-01-09 VITALS
TEMPERATURE: 98 F | SYSTOLIC BLOOD PRESSURE: 119 MMHG | DIASTOLIC BLOOD PRESSURE: 86 MMHG | OXYGEN SATURATION: 100 % | RESPIRATION RATE: 16 BRPM | WEIGHT: 204 LBS | HEART RATE: 103 BPM | BODY MASS INDEX: 27.63 KG/M2 | HEIGHT: 72 IN

## 2025-01-09 DIAGNOSIS — I10 BENIGN ESSENTIAL HYPERTENSION: ICD-10-CM

## 2025-01-09 DIAGNOSIS — G47.33 OSA (OBSTRUCTIVE SLEEP APNEA): Primary | ICD-10-CM

## 2025-01-09 PROCEDURE — 3008F BODY MASS INDEX DOCD: CPT | Performed by: INTERNAL MEDICINE

## 2025-01-09 PROCEDURE — 3079F DIAST BP 80-89 MM HG: CPT | Performed by: INTERNAL MEDICINE

## 2025-01-09 PROCEDURE — 99214 OFFICE O/P EST MOD 30 MIN: CPT | Performed by: INTERNAL MEDICINE

## 2025-01-09 PROCEDURE — 3074F SYST BP LT 130 MM HG: CPT | Performed by: INTERNAL MEDICINE

## 2025-01-09 ASSESSMENT — SLEEP AND FATIGUE QUESTIONNAIRES
SITING INACTIVE IN A PUBLIC PLACE LIKE A CLASS ROOM OR A MOVIE THEATER: WOULD NEVER DOZE
SLEEP_PROBLEM_INTERFERES_DAILY_ACTIVITIES: A LITTLE
HOW LIKELY ARE YOU TO NOD OFF OR FALL ASLEEP WHILE WATCHING TV: MODERATE CHANCE OF DOZING
SLEEP_PROBLEM_NOTICEABLE_TO_OTHERS: SOMEWHAT
HOW LIKELY ARE YOU TO NOD OFF OR FALL ASLEEP IN A CAR, WHILE STOPPED FOR A FEW MINUTES IN TRAFFIC: WOULD NEVER DOZE
HOW LIKELY ARE YOU TO NOD OFF OR FALL ASLEEP WHILE SITTING AND TALKING TO SOMEONE: WOULD NEVER DOZE
HOW LIKELY ARE YOU TO NOD OFF OR FALL ASLEEP WHEN YOU ARE A PASSENGER IN A CAR FOR AN HOUR WITHOUT A BREAK: SLIGHT CHANCE OF DOZING
DIFFICULTY_STAYING_ASLEEP: MILD
HOW LIKELY ARE YOU TO NOD OFF OR FALL ASLEEP WHILE SITTING AND READING: SLIGHT CHANCE OF DOZING
HOW LIKELY ARE YOU TO NOD OFF OR FALL ASLEEP WHILE SITTING QUIETLY AFTER LUNCH WITHOUT ALCOHOL: SLIGHT CHANCE OF DOZING
HOW LIKELY ARE YOU TO NOD OFF OR FALL ASLEEP WHILE LYING DOWN TO REST IN THE AFTERNOON WHEN CIRCUMSTANCES PERMIT: MODERATE CHANCE OF DOZING
SATISFACTION_WITH_CURRENT_SLEEP_PATTERN: SATISFIED
ESS-CHAD TOTAL SCORE: 7
WORRIED_DISTRESSED_DUE_TO_SLEEP: A LITTLE
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
## 2025-01-20 PROBLEM — G47.33 OSA (OBSTRUCTIVE SLEEP APNEA): Status: ACTIVE | Noted: 2025-01-20

## 2025-01-20 PROBLEM — I10 BENIGN ESSENTIAL HYPERTENSION: Status: ACTIVE | Noted: 2025-01-20

## 2025-01-20 NOTE — PATIENT INSTRUCTIONS
"Thank you for coming to the Sleep Medicine Clinic today! Your sleep medicine provider today was: Maricel Santa MD Below is a summary of your treatment plan, patient education, other important information, and our contact numbers.      TREATMENT PLAN     - Start auto-CPAP. Order placed and sent to [employer].  - Please read the \"Patient Education\" section below for more detailed information. Try implementing tips, reminders, strategies, and supportive management.   - If not yet done, please sign up for [employer] to make a future schedule, send prescription requests, or send messages.    Follow-up Appointment:   Follow-up in 31-90 days after getting new machine.    PATIENT EDUCATION     OBSTRUCTIVE SLEEP APNEA (BE) is a sleep disorder where your upper airway muscles relax during sleep and the airway intermittently and repetitively narrows and collapses leading to partially blocked airway (hypopnea) or completely blocked airway (apnea) which, in turn, can disrupt breathing in sleep, lower oxygen levels while you sleep and cause night time wakings. Because both apnea and hypopnea may cause higher carbon dioxide or low oxygen levels, untreated BE can lead to heart arrhythmia, elevation of blood pressure, and make it harder for the body to consolidate memory and facilitate metabolism (leading to higher blood sugars at night). Frequent partial arousals occur during sleep resulting in sleep deprivation and daytime sleepiness. BE is associated with an increased risk of cardiovascular disease, stroke, hypertension, and insulin resistance. Moreover, untreated BE with excessive daytime sleepiness can increase the risk of motor vehicular accidents.    Below are conservative strategies for BE regardless of BE severity are:   Positional therapy - Avoid sleeping on your back.   Healthy diet and regular exercise to optimize weight is highly encouraged.   Avoid alcohol late in the evening and sedative-hypnotics as " these substances can make sleep apnea worse.   Improve breathing through the nose with intranasal steroid spray, saline rinse, or antihistamines    Safety: Avoid driving vehicle and operating heavy equipment while sleepy. Drowsy driving may lead to life-threatening motor vehicle accidents. A person driving while sleepy is 5 times more likely to have an accident. If you feel sleepy, pull over and take a short power nap (sleep for less than 30 minutes). Otherwise, ask somebody to drive you.    Treatment options for sleep apnea include weight management, positional therapy, Positive Airway Therapy (PAP) therapy, oral appliance therapy, hypoglossal nerve stimulator (Inspire) and select airway surgeries.    Starting Positive Airway Pressure (PAP): You were ordered a device to wear when you sleep called PAP (Positive Airway Pressure) to treat your sleep apnea. The order will be submitted to a durable medical equipment (DME) company who will arrange setting you up with the device. They will provide all the necessary equipment and discuss use and maintenance of the device with you as well as mask fitting and process of replacing / renewing PAP supplies or accessories. Once you get the machine, please start using it immediately. You may not be successful right away and that is okay. [place] be certain that you keep trying nightly and reach out to DME if you are struggling or having issues with machine usage.     *Please follow-up with me in 1-2 months of starting CPAP to see how well it is working for you and to do some troubleshooting if needed. Also, please bring all PAP equipment with you to follow up appointments unless told otherwise.     Important things to keep in mind as you start PAP:  Insurance will monitor your usage during the first 90 days. You should use your PAP - all night, every night, and including all naps (especially if naps are more than 30 minutes) for your health. The bare minimum is to use your PAP  device while sleeping for at least 4 hours per day at least 5-6 days per week.. Otherwise, your PAP device will be reclaimed by your DME company at 90 days.  There are many masks to choose from to wear with your PAP machine. If you are not comfortable with the first mask issued to you, call your DME company and ask for another option to try. You typically have a 30-day mask guarantee from the day you received your machine.   Discuss with your provider if you are having issues breathing with the machine or if the temperature or humidity feel uncomfortable.  Expect to have an adjustment period when you start your device. It helps to continuing wearing the machine every day for a period of time until you get more used to it. You can practice with wearing the mask alone if you need, then add in the PAP air pressure a few days later.   Reach out for help if you are struggling! The sleep medicine department can be reached at [phone]  We encourage you to download data monitoring apps to your phone. For [employer] 10/[employer] - My[employer]. For [employer] - Dream[employer]. Both apps are available in the [employer] store for free and are a great tool to monitor your progress with your PAP device night to night.    Tips for success with PAP machine usage:  Comfortable and well-fitting mask  Appropriate pressure on the machine  Using humidification  Support from bed partner and clinical team      Maintaining your CPAP/BPAP device:    The humidification chamber (aka water tank or water chamber) needs to be filled with distilled water to prevent buildup of white deposits in the future. If you cannot find distilled water, you can use tap water but expect to have white deposits buildup seen after prolonged use with tap water. If you start seeing white deposits on the water chamber, you can clean it by filling it with equal parts of distilled white vinegar and water. Let the vinegar-water mixture sit for 2 hours, and then  rinse it with running tap water. Clean with soap and water then let it dry.     You should try to keep your machine clean in order to work well. Here are some tips to clean PAP supplies / accessories:    Clean the humidification chamber (aka water tank) as well as your mask and tubing at least once a week with soap and water.   Alternatively, you can fill a sink or basin with warm water and add a little mild detergent, like Ivory dish soap. Gently wipe your supplies with the soapy water to free all the oils and dirt that may have collected. Once that's done, rinse these items with clean water until the soap is gone and let them air dry. You can hang your tubing over the curtain vladimir in your bathroom so that it dries.  The mask insert (part of the mask that has contact with your skin) needs to be cleaned with soap and water daily. Another option is to wipe them down with CPAP wipes or baby wipes.    You should replace your mask and tubing frequently in order to prevent bacteria buildup, machine damage, and mask seal issues. The older the mask and hose, the high likelihood that there is bacteria buildup in it especially if they are not cleaned regularly. Dirty filters damage machines because build-up of dust and contaminants can cause machine to over-heat, and in time, damage the motor of machine. Cushions lose their seal over time as most masks are made of plastic and silicone while headgear is made of neoprene. These materials will break down with age and frequent use. Here is the recommended replacement schedule for PAP supplies / accessories:    Twice a month- disposable filters and cushions for nasal mask or nasal pillows.  Once a month- cushion for full face mask  Every 3 months- mask with headgear and PAP tubing (standard or heated hose)  Every 6 months- reusable filter, water chamber, and chin strap     Other useful information:    Some people do not put water in the tank while other people prefers to put water  in the tank to prevent mouth dryness. Try to experiment to determine which is more comfortable for you.   In general, new machines have 2 years warranty on parts while health insurance allows you to have a new machine once every 5 years.     Common issues with PAP machine:    Mask gets dislodged when turning to the side: Consider getting a CPAP pillow or switching to a mask with hose on top.     Dry mouth:  Your machine has built-in humidifier that heats up the air to prevent dry mouth. It can be adjusted to your comfort. You can try that first and increase setting one level one night at a time to check which setting is comfortable and effective in lessening dry mouth. In some patients with heated hose, adjusting tube temperature to make air warmer can improve dry mouth. If dry mouth persists despite adjusting humidity or tube temperature setting, may apply OTC Biotene gel over the gums at bedtime.  If Biotene gel is not effective, consider trying XEROSTOM gel from [employer].  Also, eliminate or reduce dose of medications that can cause dry mouth if possible. Lastly, may try getting a separate room humidifier machine.    Airleaks: Please call DME as they may need to adjust your mask or refit you with a different kind or different size of mask. In addition, you can ask DME for tips on getting a good mask seal and mask fit.     Difficulty tolerating the mask: Contact your DME to try a different kind of mask and/or call office to get a referral to Sleep Psychologist for CPAP desensitization. CPAP desensitization technique is a set of strategies that helps patient cope with claustrophobia and anxiety related to wearing mask. Alternatively, we can do a daytime mini-sleep study called PAP-nap trial wherein you will try on different kinds of mask and the sleep technician will try different pressure settings on CPAP and BPAP machines to see which specific pressure is tolerable and comfortable for you.     Water droplets or  "moisture within the hose and/or mask: This is called rain-out and it is caused by condensation of too much heated humidity on the cooler walls of the hose. If you have rain-out, turn down humidity settings or get a heated hose. If you already have a heated hose, turn up the \"tube temperature\" of the heated hose. Alternatively, if you don't want to get a heated hose or warmer air, may wrap the CPAP hose with stockings to keep it somewhat warm. Also, you need to place the machine on the floor and lower the hose so that water won't travel upward towards your mask.     PAP desensitization techniques: If you have concerns about something being on your face at night, you can start by getting used to it before trying to sleep with it as follows:      Sit in a comfortable chair or bed. Connect the mask and hose to the CPAP/BPAP machine. Hold the mask on your face (without straps on) and turn on the machine. Practice breathing with the mask on while awake sitting and watching television, reading, or performing a sedentary activity during the day for 5-10 minutes and then take it off.  If tolerated, try again and gradually build up to longer periods of time. If not tolerated, try and try again until it is more comfortable as you become more desensitized. If you are able to use it for at least 20-30 minutes, move unto the next step.     Sit in a comfortable chair or bed. Connect the mask and hose to the CPAP/BPAP machine. Strap the mask on your head and turn on the machine. Practice breathing with the mask and headgear on while awake sitting and watching television, reading, or performing a sedentary activity. Start with 5-10 minutes and gradually increasing time until you can wear it comfortably for at least 20-30 minutes, then move to the next step.    Take a shorter daytime nap with machine turned on while you are in a reclined position in bed, sofa, or recliner. Start with 5-10 minute nap and gradually increase up to 30 " minutes. It is not important whether you fall asleep or not. The goal is to rest comfortably with PAP machine on.     Reintroduce PAP machine into nighttime sleep. You can begin using it a portion of the night and gradually increase up to entire night.     Proceed from one step to the next only when you are completely comfortable. If you feel any anxiety or discomfort, return to the previous step, then proceed again when comfortable.    Expect to “work” with your CPAP/BIPAP unit. It is important to try to relax when beginning CPAP/BIPAP therapy. Inhalation and exhalation should occur through the nose only. If you are unable to consistently breathe this way, do not panic or lose hope. There are other types of masks which allow you to breathe through your nose and/or your mouth. Also, in some patients, using intranasal steroid spray (e.g. Flonase or Nasocort or Fluticasone) 1 hour before bedtime and/or before putting on CPAP mask can help tolerate breathing through the mask.    Don't give up after a few attempts--some patients adjust quickly, while some patients need 3-4 weeks (or sometimes even longer) to be accustomed to CPAP therapy.  Contact your sleep medicine specialist if you have a significant change in weight since this may affect your pressure.    You can also go to the following EDUCATION WEBSITES for further information:   American Academy of Sleep Medicine http://sleepeducation.org  National Sleep Foundation: https://sleepfoundation.org  American Sleep Apnea Association: https://www.sleepapnea.org (for patients with sleep apnea)  Narcolepsy Network: https://www.narcolepsynetwork.org (for patients with narcolepsy)  [employer]: https://www.wakeupnarcolepsy.org (for patients with narcolepsy)  Hypersomnia Foundation: https://www.hypersomniafoundation.org (for patients with idiopathic hypersomnia)  RLS foundation: https://www.rls.org (for patients with restless leg syndrome)    IMPORTANT INFORMATION      Call 911 for medical emergencies.  Our offices are generally open from Monday-Friday, 8 am - 5 pm.   There are no supporting services by either the sleep doctors or their staff on weekends and Holidays, or after 5 PM on weekdays.   If you need to get in touch with me, you may either call my office number or you can use CaseRev.  If a referral for a test, for CPAP, or for another specialist was made, and you have not heard about scheduling this within a week, please call scheduling at [phone] ([phone]).  If you are unable to make your appointment for clinic or an overnight study, kindly call the office or sleep testing center at least 48 hours in advance to cancel and reschedule.  If you are on CPAP, please bring your device's card and/or the device to each clinic appointment.   In case of problems with PAP machine or mask interface, please contact your DME (Durable Medical Equipment) company first. DME is the company who provides you the machine and/or PAP supplies.       PRESCRIPTIONS     We require 7 days advanced notice for prescription refills. If we do not receive the request in this time, we cannot guarantee that your medication will be refilled in time.    IMPORTANT PHONE NUMBERS     Sleep Medicine Clinic Fax: [phone]  Appointments (for Pediatric Sleep Clinic): [phone] - option 1  Appointments (for Adult Sleep Clinic): [phone] - option 2  Appointments (For Sleep Studies): [phone] - option 3  Behavioral Sleep Medicine: [phone]  Sleep Surgery: [phone]  Nutrition Service: [phone]  Weight management clinics with endocrinology: [phone]  Bariatric Services: [phone] (includes weight loss medications and weight loss surgery)  [facility]: [phone] (offers holistic approaches to weight management)  ENT (Otolaryngology): [phone]  Headache Clinic (Neurology): [phone]  Neurology: [phone]  Psychiatry:  [phone]  Pulmonary Function Testing (PFT) Center: [phone]  Pulmonary Medicine: [phone]  Medical Service [employer]): [phone]      OUR SLEEP TESTING LOCATIONS     Our team will contact you to schedule your sleep study, however, you can contact us as follow:  Main Phone Line (scheduling only): [phone]), option 3  Adult and Pediatric Locations  [facility] (6 years and older): [facility] - 4th floor ([address]) After hours line: [phone]  [facility] at [facility] (Main campus: All ages): [facility], 6th floor. After hours line: [phone]   [place] (5 years and older; younger considered on case-by-case basis): [address]; [address], [address]. Scheduling  After hours line: [phone]   [place] (6 years and older): [address]; [address]; [address]   [place] (6 years and older): [address] [facility] [address], [address]  After hours line: [phone]   [place] (13 years and older) in [place]: [address], 2nd [address]  After hours line: [phone]   [place] (13 year and older): [address]  After hours line: [phone]     Adult Only Locations:   [place] (18 years and older): [address] [facility] [address], 2nd floor   [place] (18 years and older): [address] [facility]; 4th floor  After hours line: [phone]  [facility] (18 years and older) at [place]: [address] [facility]  After hours line: [phone]     CONTACTING YOUR SLEEP MEDICINE PROVIDER AND SLEEP TEAM      For issues with your machine or mask interface, please call your DME provider first. DME stands for durable medical company. DME is the company who provides you the machine and/or PAP supplies / accessories.   To schedule, cancel, or reschedule SLEEP STUDY APPOINTMENTS, please call the Main Phone Line at [phone] - option 3.   To schedule, cancel, or reschedule CLINIC  "APPOINTMENTS, you can do it in \"MyChart\", call [phone] (direct line of [facility]), call [phone] (direct line of [facility]), or call the Main Phone Line at [phone] - option 2  For CLINICAL QUESTIONS or MEDICATION REFILLS, please call direct line for Adult Sleep Nurses at [phone].   Lastly, you can also send a message directly to your provider through \"My Chart\", which is the email service through your  Records Account: [email or web address]       Here at [facility], we wish you a restful sleep!    "

## 2025-02-17 ENCOUNTER — APPOINTMENT (OUTPATIENT)
Dept: CARDIOLOGY | Facility: HOSPITAL | Age: 30
End: 2025-02-17
Payer: COMMERCIAL

## 2025-02-17 ENCOUNTER — TELEPHONE (OUTPATIENT)
Dept: PRIMARY CARE | Facility: CLINIC | Age: 30
End: 2025-02-17
Payer: COMMERCIAL

## 2025-02-17 ENCOUNTER — HOSPITAL ENCOUNTER (EMERGENCY)
Facility: HOSPITAL | Age: 30
Discharge: HOME | End: 2025-02-17
Attending: STUDENT IN AN ORGANIZED HEALTH CARE EDUCATION/TRAINING PROGRAM
Payer: COMMERCIAL

## 2025-02-17 VITALS
WEIGHT: 200 LBS | HEART RATE: 85 BPM | SYSTOLIC BLOOD PRESSURE: 160 MMHG | DIASTOLIC BLOOD PRESSURE: 98 MMHG | HEIGHT: 71 IN | OXYGEN SATURATION: 99 % | BODY MASS INDEX: 28 KG/M2 | RESPIRATION RATE: 16 BRPM | TEMPERATURE: 96.8 F

## 2025-02-17 DIAGNOSIS — R00.2 PALPITATIONS: Primary | ICD-10-CM

## 2025-02-17 LAB
ALBUMIN SERPL BCP-MCNC: 5 G/DL (ref 3.4–5)
ALP SERPL-CCNC: 66 U/L (ref 33–120)
ALT SERPL W P-5'-P-CCNC: 51 U/L (ref 10–52)
ANION GAP BLDV CALCULATED.4IONS-SCNC: 8 MMOL/L (ref 10–25)
ANION GAP SERPL CALC-SCNC: 13 MMOL/L (ref 10–20)
AST SERPL W P-5'-P-CCNC: 36 U/L (ref 9–39)
ATRIAL RATE: 86 BPM
BASE EXCESS BLDV CALC-SCNC: 4.6 MMOL/L (ref -2–3)
BASOPHILS # BLD AUTO: 0.04 X10*3/UL (ref 0–0.1)
BASOPHILS NFR BLD AUTO: 0.7 %
BILIRUB SERPL-MCNC: 0.7 MG/DL (ref 0–1.2)
BNP SERPL-MCNC: 9 PG/ML (ref 0–99)
BODY TEMPERATURE: 37 DEGREES CELSIUS
BUN SERPL-MCNC: 10 MG/DL (ref 6–23)
CA-I BLDV-SCNC: 1.19 MMOL/L (ref 1.1–1.33)
CALCIUM SERPL-MCNC: 9.9 MG/DL (ref 8.6–10.3)
CARDIAC TROPONIN I PNL SERPL HS: 8 NG/L (ref 0–20)
CARDIAC TROPONIN I PNL SERPL HS: 8 NG/L (ref 0–20)
CHLORIDE BLDV-SCNC: 100 MMOL/L (ref 98–107)
CHLORIDE SERPL-SCNC: 99 MMOL/L (ref 98–107)
CO2 SERPL-SCNC: 26 MMOL/L (ref 21–32)
CREAT SERPL-MCNC: 0.73 MG/DL (ref 0.5–1.3)
EGFRCR SERPLBLD CKD-EPI 2021: >90 ML/MIN/1.73M*2
EOSINOPHIL # BLD AUTO: 0.07 X10*3/UL (ref 0–0.7)
EOSINOPHIL NFR BLD AUTO: 1.3 %
ERYTHROCYTE [DISTWIDTH] IN BLOOD BY AUTOMATED COUNT: 12.3 % (ref 11.5–14.5)
GLUCOSE BLDV-MCNC: 109 MG/DL (ref 74–99)
GLUCOSE SERPL-MCNC: 103 MG/DL (ref 74–99)
HCO3 BLDV-SCNC: 28.3 MMOL/L (ref 22–26)
HCT VFR BLD AUTO: 44.9 % (ref 41–52)
HCT VFR BLD EST: 49 % (ref 41–52)
HGB BLD-MCNC: 15.4 G/DL (ref 13.5–17.5)
HGB BLDV-MCNC: 16.4 G/DL (ref 13.5–17.5)
IMM GRANULOCYTES # BLD AUTO: 0.03 X10*3/UL (ref 0–0.7)
IMM GRANULOCYTES NFR BLD AUTO: 0.6 % (ref 0–0.9)
INHALED O2 CONCENTRATION: 21 %
LACTATE BLDV-SCNC: 2 MMOL/L (ref 0.4–2)
LYMPHOCYTES # BLD AUTO: 1.88 X10*3/UL (ref 1.2–4.8)
LYMPHOCYTES NFR BLD AUTO: 35.1 %
MAGNESIUM SERPL-MCNC: 1.66 MG/DL (ref 1.6–2.4)
MCH RBC QN AUTO: 29.3 PG (ref 26–34)
MCHC RBC AUTO-ENTMCNC: 34.3 G/DL (ref 32–36)
MCV RBC AUTO: 85 FL (ref 80–100)
MONOCYTES # BLD AUTO: 0.35 X10*3/UL (ref 0.1–1)
MONOCYTES NFR BLD AUTO: 6.5 %
NEUTROPHILS # BLD AUTO: 2.98 X10*3/UL (ref 1.2–7.7)
NEUTROPHILS NFR BLD AUTO: 55.8 %
NRBC BLD-RTO: 0 /100 WBCS (ref 0–0)
OXYHGB MFR BLDV: 57.6 % (ref 45–75)
P AXIS: 62 DEGREES
PCO2 BLDV: 38 MM HG (ref 41–51)
PH BLDV: 7.48 PH (ref 7.33–7.43)
PLATELET # BLD AUTO: 247 X10*3/UL (ref 150–450)
PO2 BLDV: 34 MM HG (ref 35–45)
POTASSIUM BLDV-SCNC: 4.2 MMOL/L (ref 3.5–5.3)
POTASSIUM SERPL-SCNC: 4.1 MMOL/L (ref 3.5–5.3)
PR INTERVAL: 148 MS
PROT SERPL-MCNC: 7.6 G/DL (ref 6.4–8.2)
Q ONSET: 253 MS
QRS COUNT: 14 BEATS
QRS DURATION: 91 MS
QT INTERVAL: 363 MS
QTC CALCULATION(BAZETT): 432 MS
QTC FREDERICIA: 407 MS
R AXIS: 31 DEGREES
RBC # BLD AUTO: 5.26 X10*6/UL (ref 4.5–5.9)
SAO2 % BLDV: 58 % (ref 45–75)
SODIUM BLDV-SCNC: 132 MMOL/L (ref 136–145)
SODIUM SERPL-SCNC: 134 MMOL/L (ref 136–145)
T AXIS: 32 DEGREES
T OFFSET: 435 MS
TSH SERPL-ACNC: 1.56 MIU/L (ref 0.44–3.98)
VENTRICULAR RATE: 85 BPM
WBC # BLD AUTO: 5.4 X10*3/UL (ref 4.4–11.3)

## 2025-02-17 PROCEDURE — 84484 ASSAY OF TROPONIN QUANT: CPT | Performed by: STUDENT IN AN ORGANIZED HEALTH CARE EDUCATION/TRAINING PROGRAM

## 2025-02-17 PROCEDURE — 36415 COLL VENOUS BLD VENIPUNCTURE: CPT | Performed by: STUDENT IN AN ORGANIZED HEALTH CARE EDUCATION/TRAINING PROGRAM

## 2025-02-17 PROCEDURE — 84132 ASSAY OF SERUM POTASSIUM: CPT | Performed by: STUDENT IN AN ORGANIZED HEALTH CARE EDUCATION/TRAINING PROGRAM

## 2025-02-17 PROCEDURE — 84443 ASSAY THYROID STIM HORMONE: CPT | Performed by: STUDENT IN AN ORGANIZED HEALTH CARE EDUCATION/TRAINING PROGRAM

## 2025-02-17 PROCEDURE — 2500000002 HC RX 250 W HCPCS SELF ADMINISTERED DRUGS (ALT 637 FOR MEDICARE OP, ALT 636 FOR OP/ED): Performed by: STUDENT IN AN ORGANIZED HEALTH CARE EDUCATION/TRAINING PROGRAM

## 2025-02-17 PROCEDURE — 99284 EMERGENCY DEPT VISIT MOD MDM: CPT | Performed by: STUDENT IN AN ORGANIZED HEALTH CARE EDUCATION/TRAINING PROGRAM

## 2025-02-17 PROCEDURE — 83880 ASSAY OF NATRIURETIC PEPTIDE: CPT | Performed by: STUDENT IN AN ORGANIZED HEALTH CARE EDUCATION/TRAINING PROGRAM

## 2025-02-17 PROCEDURE — 83735 ASSAY OF MAGNESIUM: CPT | Performed by: STUDENT IN AN ORGANIZED HEALTH CARE EDUCATION/TRAINING PROGRAM

## 2025-02-17 PROCEDURE — 85025 COMPLETE CBC W/AUTO DIFF WBC: CPT | Performed by: STUDENT IN AN ORGANIZED HEALTH CARE EDUCATION/TRAINING PROGRAM

## 2025-02-17 PROCEDURE — 93005 ELECTROCARDIOGRAM TRACING: CPT

## 2025-02-17 RX ORDER — DIAZEPAM 5 MG/1
5 TABLET ORAL ONCE
Status: COMPLETED | OUTPATIENT
Start: 2025-02-17 | End: 2025-02-17

## 2025-02-17 RX ADMIN — DIAZEPAM 5 MG: 5 TABLET ORAL at 11:31

## 2025-02-17 ASSESSMENT — COLUMBIA-SUICIDE SEVERITY RATING SCALE - C-SSRS
2. HAVE YOU ACTUALLY HAD ANY THOUGHTS OF KILLING YOURSELF?: NO
6. HAVE YOU EVER DONE ANYTHING, STARTED TO DO ANYTHING, OR PREPARED TO DO ANYTHING TO END YOUR LIFE?: NO
1. IN THE PAST MONTH, HAVE YOU WISHED YOU WERE DEAD OR WISHED YOU COULD GO TO SLEEP AND NOT WAKE UP?: NO

## 2025-02-17 ASSESSMENT — PAIN SCALES - GENERAL: PAINLEVEL_OUTOF10: 5 - MODERATE PAIN

## 2025-02-17 ASSESSMENT — PAIN DESCRIPTION - DESCRIPTORS: DESCRIPTORS: PRESSURE

## 2025-02-17 ASSESSMENT — PAIN DESCRIPTION - LOCATION: LOCATION: CHEST

## 2025-02-17 ASSESSMENT — PAIN DESCRIPTION - FREQUENCY: FREQUENCY: CONSTANT/CONTINUOUS

## 2025-02-17 ASSESSMENT — PAIN DESCRIPTION - ORIENTATION: ORIENTATION: LEFT

## 2025-02-17 ASSESSMENT — PAIN - FUNCTIONAL ASSESSMENT: PAIN_FUNCTIONAL_ASSESSMENT: 0-10

## 2025-02-17 NOTE — TELEPHONE ENCOUNTER
Patient has been having difficulty breathing since Saturday has been having difficulty breathing. Believes it may be related to blood pressure please advise. Patient describes the feeling of a weight on chest. Spoke with MA recommended ED

## 2025-02-17 NOTE — ED PROVIDER NOTES
HPI   Chief Complaint   Patient presents with    Palpitations       HPI: Patient is a 29-year-old male, he has a past medical history of hypertension, BE, daily alcohol use, he is presenting to the emergency department today for concern for palpitations.  Patient reports that over the past 2 days he has noticed some pain and pressure in his chest, some difficulty breathing associated with the symptoms.  He reports that he has noticed a little bit of soreness to the left side of the chest.  He reports he has noticed his blood pressure has been severely elevated and has been increasing his lisinopril at home.  He reports that today he felt like his heart was racing, he was shaking, and felt very anxious.  He reports he has been through alcohol withdrawal but this does not feel similar.  He has not had any change in his alcohol intake or use he denies any thyroid dysfunction.  He denies any kidney or liver problems that he knows of.  He denies any personal heart history.  He reports that he was recently diagnosed with BE this year but is not received his CPAP due to cost      ROS: Complete 12 point review of systems performed, otherwise negative except as noted in the history of present illness    PMH: Reviewed, documented below in note. Pertinents in HPI  PSH: Reviewed and documented below in note. Pertinents in HPI  SH: Daily alcohol use, no illicits, not homeless  Fam: Reviewed, noncontributory to patients current complaint  MEDS: Reviewed and documented below in note. Pertinents in HPI  ALLERGIES: Reviewed and documented below in note.        History provided by:  Patient and medical records   used: No                          Teresita Coma Scale Score: 15                  Patient History   Past Medical History:   Diagnosis Date    Hypertension     Other specified health status     No pertinent past medical history     History reviewed. No pertinent surgical history.  Family History  "  Problem Relation Name Age of Onset    Hypertension Mother       Social History     Tobacco Use    Smoking status: Former     Current packs/day: 0.00     Types: Cigarettes     Quit date:      Years since quittin.1    Smokeless tobacco: Former     Types: Chew   Vaping Use    Vaping status: Every Day    Substances: Nicotine   Substance Use Topics    Alcohol use: Yes    Drug use: Never       Physical Exam   Visit Vitals  BP (!) 152/92   Pulse 83   Temp 36 °C (96.8 °F) (Temporal)   Resp 16   Ht 1.803 m (5' 11\")   Wt 90.7 kg (200 lb)   SpO2 99%   BMI 27.89 kg/m²   Smoking Status Former   BSA 2.13 m²      Physical Exam  Vitals and nursing note reviewed.   Constitutional:       Appearance: Normal appearance.   HENT:      Head: Normocephalic and atraumatic.   Neck:      Vascular: No carotid bruit.   Cardiovascular:      Rate and Rhythm: Normal rate and regular rhythm.      Pulses: Normal pulses.      Heart sounds: Normal heart sounds.   Pulmonary:      Effort: Pulmonary effort is normal.      Breath sounds: Normal breath sounds.   Abdominal:      General: There is no distension.      Palpations: Abdomen is soft.      Tenderness: There is no abdominal tenderness. There is no guarding or rebound.   Musculoskeletal:         General: No tenderness, deformity or signs of injury.      Cervical back: Normal range of motion. No rigidity.      Right lower leg: No edema.      Left lower leg: No edema.   Skin:     General: Skin is warm and dry.      Capillary Refill: Capillary refill takes less than 2 seconds.   Neurological:      General: No focal deficit present.      Mental Status: He is alert and oriented to person, place, and time.      Sensory: No sensory deficit.      Motor: No weakness.   Psychiatric:         Behavior: Behavior normal.      Comments: Anxious appearing, tremulous on exam         No orders to display       Labs Reviewed   COMPREHENSIVE METABOLIC PANEL - Abnormal       Result Value    Glucose 103 (*)     " Sodium 134 (*)     Potassium 4.1      Chloride 99      Bicarbonate 26      Anion Gap 13      Urea Nitrogen 10      Creatinine 0.73      eGFR >90      Calcium 9.9      Albumin 5.0      Alkaline Phosphatase 66      Total Protein 7.6      AST 36      Bilirubin, Total 0.7      ALT 51     BLOOD GAS VENOUS FULL PANEL - Abnormal    POCT pH, Venous 7.48 (*)     POCT pCO2, Venous 38 (*)     POCT pO2, Venous 34 (*)     POCT SO2, Venous 58      POCT Oxy Hemoglobin, Venous 57.6      POCT Hematocrit Calculated, Venous 49.0      POCT Sodium, Venous 132 (*)     POCT Potassium, Venous 4.2      POCT Chloride, Venous 100      POCT Ionized Calicum, Venous 1.19      POCT Glucose, Venous 109 (*)     POCT Lactate, Venous 2.0      POCT Base Excess, Venous 4.6 (*)     POCT HCO3 Calculated, Venous 28.3 (*)     POCT Hemoglobin, Venous 16.4      POCT Anion Gap, Venous 8.0 (*)     Patient Temperature 37.0      FiO2 21     MAGNESIUM - Normal    Magnesium 1.66     TSH WITH REFLEX TO FREE T4 IF ABNORMAL - Normal    Thyroid Stimulating Hormone 1.56      Narrative:     TSH testing is performed using different testing methodology at [facility] than at other [facility]. Direct result comparisons should only be made within the same method.     SERIAL TROPONIN-INITIAL - Normal    Troponin I, High Sensitivity 8      Narrative:     Less than 99th percentile of normal range cutoff-  Female and children under 18 years old <14 ng/L; Male <21 ng/L: Negative  Repeat testing should be performed if clinically indicated.     Female and children under 18 years old 14-50 ng/L; Male 21-50 ng/L:  Consistent with possible cardiac damage and possible increased clinical   risk. Serial measurements may help to assess extent of myocardial damage.     >50 ng/L: Consistent with cardiac damage, increased clinical risk and  myocardial infarction. Serial measurements may help assess extent of   myocardial damage.      NOTE: Children less than 1 year old  may have higher baseline troponin   levels and results should be interpreted in conjunction with the overall   clinical context.     NOTE: Troponin I testing is performed using a different   testing methodology at [facility] than at other   [facility] hospitals. Direct result comparisons should only   be made within the same method.   B-TYPE NATRIURETIC PEPTIDE - Normal    BNP 9      Narrative:        <100 pg/mL - Heart failure unlikely  100-299 pg/mL - Intermediate probability of acute heart                  failure exacerbation. Correlate with clinical                  context and patient history.    >=300 pg/mL - Heart Failure likely. Correlate with clinical                  context and patient history.    BNP testing is performed using different testing methodology at [facility] than at other [facility] hospitals. Direct result comparisons should only be made within the same method.      SERIAL TROPONIN, 1 HOUR - Normal    Troponin I, High Sensitivity 8      Narrative:     Less than 99th percentile of normal range cutoff-  Female and children under 18 years old <14 ng/L; Male <21 ng/L: Negative  Repeat testing should be performed if clinically indicated.     Female and children under 18 years old 14-50 ng/L; Male 21-50 ng/L:  Consistent with possible cardiac damage and possible increased clinical   risk. Serial measurements may help to assess extent of myocardial damage.     >50 ng/L: Consistent with cardiac damage, increased clinical risk and  myocardial infarction. Serial measurements may help assess extent of   myocardial damage.      NOTE: Children less than 1 year old may have higher baseline troponin   levels and results should be interpreted in conjunction with the overall   clinical context.     NOTE: Troponin I testing is performed using a different   testing methodology at [facility] than at other   [facility]. Direct result comparisons should only   be made  within the same method.   CBC WITH AUTO DIFFERENTIAL    WBC 5.4      nRBC 0.0      RBC 5.26      Hemoglobin 15.4      Hematocrit 44.9      MCV 85      MCH 29.3      MCHC 34.3      RDW 12.3      Platelets 247      Neutrophils % 55.8      Immature Granulocytes %, Automated 0.6      Lymphocytes % 35.1      Monocytes % 6.5      Eosinophils % 1.3      Basophils % 0.7      Neutrophils Absolute 2.98      Immature Granulocytes Absolute, Automated 0.03      Lymphocytes Absolute 1.88      Monocytes Absolute 0.35      Eosinophils Absolute 0.07      Basophils Absolute 0.04     TROPONIN SERIES- (INITIAL, 1 HR)    Narrative:     The following orders were created for panel order Troponin I Series, High Sensitivity (0, 1 HR).  Procedure                               Abnormality         Status                     ---------                               -----------         ------                     Troponin I, High Sensiti...[[id number]]  Normal              Final result               Troponin, High Sensitivi...[[id number]]  Normal              Final result                 Please view results for these tests on the individual orders.         ED Course & MDM   Diagnoses as of 02/17/25 1356   Palpitations           Medical Decision Making  All mentioned lab results, ECGs, and imaging were independently reviewed by myself  - Patient evaluated. Patient is presenting to the emergency department today for palpitations.  Some shortness of breath and chest pain.  He also appears very anxious on examination.  Differential includes but is not limited to potential panic attack, alcohol withdrawal.  ACS, myocarditis, heart failure, hyperthyroidism, or dehydration with electrolyte derangements to name a few.  Workup was undertaken with labs and EKGs.  EKG as interpreted by myself demonstrates sinus rhythm with ventricular rate of 85, normal axis, normal intervals, no evidence of an acute STEMI or malignant arrhythmia.  Patient's labs are  reassuring apart from a mild respiratory alkalosis.  Patient was given oral Valium here in the emergency department with significant improvement of his symptoms.  Cardiac enzymes and thyroid function within normal limits.  On repeat evaluation his vitals are stable.  I feel that the patient is discharged with close outpatient follow-up to his primary care physician.  Patient was given strict return precautions and discharged otherwise stable condition.    - Monitored for any changes in stability or symptomatology. Patient remained stable.   - Counseled regarding labs, imaging, diagnosis, and plan. Patient was agreeable. All questions were answered. The patient was receptive and agreeable to the plan of care.   -The patient was instructed to return to the emergency department if any symptoms recurred, worsened, or if there were any additional concerns.    *Disclaimer: This note was dictated by speech recognition. Minor errors in transcription may be present. Please call with questions.    Chuy Randolph MD             Your medication list        ASK your doctor about these medications        Instructions Last Dose Given Next Dose Due   lisinopril 10 mg tablet      Take 1 tablet (10 mg) by mouth once daily.                Procedure  Procedures     *This report was transcribed using voice recognition software.  Every effort was made to ensure accuracy; however, inadvertent computerized transcription errors may be present.*  Stalin Randolph MD  02/17/25         Stalin Randolph MD  02/17/25 6136

## 2025-02-28 LAB
ATRIAL RATE: 86 BPM
P AXIS: 62 DEGREES
PR INTERVAL: 148 MS
Q ONSET: 253 MS
QRS COUNT: 14 BEATS
QRS DURATION: 91 MS
QT INTERVAL: 363 MS
QTC CALCULATION(BAZETT): 432 MS
QTC FREDERICIA: 407 MS
R AXIS: 31 DEGREES
T AXIS: 32 DEGREES
T OFFSET: 435 MS
VENTRICULAR RATE: 85 BPM

## 2025-07-03 ENCOUNTER — APPOINTMENT (OUTPATIENT)
Dept: PRIMARY CARE | Facility: CLINIC | Age: 30
End: 2025-07-03

## 2025-07-03 VITALS
WEIGHT: 191 LBS | BODY MASS INDEX: 26.74 KG/M2 | SYSTOLIC BLOOD PRESSURE: 120 MMHG | HEART RATE: 98 BPM | DIASTOLIC BLOOD PRESSURE: 76 MMHG | HEIGHT: 71 IN | OXYGEN SATURATION: 97 %

## 2025-07-03 DIAGNOSIS — F41.9 ANXIETY: Primary | ICD-10-CM

## 2025-07-03 DIAGNOSIS — G47.10 HYPERSOMNIA: ICD-10-CM

## 2025-07-03 DIAGNOSIS — R51.9 NONINTRACTABLE HEADACHE, UNSPECIFIED CHRONICITY PATTERN, UNSPECIFIED HEADACHE TYPE: ICD-10-CM

## 2025-07-03 DIAGNOSIS — R06.83 SNORING: ICD-10-CM

## 2025-07-03 DIAGNOSIS — I10 PRIMARY HYPERTENSION: ICD-10-CM

## 2025-07-03 DIAGNOSIS — G47.33 SEVERE OBSTRUCTIVE SLEEP APNEA: ICD-10-CM

## 2025-07-03 DIAGNOSIS — R03.0 ELEVATED BLOOD PRESSURE READING: ICD-10-CM

## 2025-07-03 DIAGNOSIS — R06.02 SHORTNESS OF BREATH: ICD-10-CM

## 2025-07-03 DIAGNOSIS — R07.9 CHEST PAIN, UNSPECIFIED TYPE: ICD-10-CM

## 2025-07-03 DIAGNOSIS — E78.2 MIXED HYPERLIPIDEMIA: ICD-10-CM

## 2025-07-03 PROCEDURE — 3008F BODY MASS INDEX DOCD: CPT | Performed by: CLINICAL NURSE SPECIALIST

## 2025-07-03 PROCEDURE — 99214 OFFICE O/P EST MOD 30 MIN: CPT | Performed by: CLINICAL NURSE SPECIALIST

## 2025-07-03 PROCEDURE — 1036F TOBACCO NON-USER: CPT | Performed by: CLINICAL NURSE SPECIALIST

## 2025-07-03 PROCEDURE — 3074F SYST BP LT 130 MM HG: CPT | Performed by: CLINICAL NURSE SPECIALIST

## 2025-07-03 PROCEDURE — 3078F DIAST BP <80 MM HG: CPT | Performed by: CLINICAL NURSE SPECIALIST

## 2025-07-03 RX ORDER — LISINOPRIL 10 MG/1
10 TABLET ORAL DAILY
Qty: 90 TABLET | Refills: 3 | Status: SHIPPED | OUTPATIENT
Start: 2025-07-03 | End: 2026-06-28

## 2025-07-03 RX ORDER — HYDROXYZINE HYDROCHLORIDE 25 MG/1
25 TABLET, FILM COATED ORAL DAILY PRN
Qty: 14 TABLET | Refills: 2 | Status: SHIPPED | OUTPATIENT
Start: 2025-07-03 | End: 2025-07-17

## 2025-07-03 ASSESSMENT — ENCOUNTER SYMPTOMS
CONSTIPATION: 0
FLANK PAIN: 0
MYALGIAS: 0
NAUSEA: 0
NECK PAIN: 0
FATIGUE: 0
CONFUSION: 0
BACK PAIN: 0
ABDOMINAL PAIN: 0
COUGH: 0
VOMITING: 0
WOUND: 0
CHILLS: 0
ARTHRALGIAS: 0
BLOOD IN STOOL: 0
DIZZINESS: 0
FEVER: 0
DIARRHEA: 0
OCCASIONAL FEELINGS OF UNSTEADINESS: 0
PALPITATIONS: 0
EYE PAIN: 0
TROUBLE SWALLOWING: 0
UNEXPECTED WEIGHT CHANGE: 0
ACTIVITY CHANGE: 0
JOINT SWELLING: 0
DEPRESSION: 0
HEADACHES: 0
CHEST TIGHTNESS: 0
DYSURIA: 0
SHORTNESS OF BREATH: 0
POLYDIPSIA: 0
LOSS OF SENSATION IN FEET: 0
PHOTOPHOBIA: 0
SLEEP DISTURBANCE: 0
HEMATURIA: 0
BRUISES/BLEEDS EASILY: 0
WHEEZING: 0
SEIZURES: 0
APPETITE CHANGE: 0
SORE THROAT: 0

## 2025-07-03 ASSESSMENT — PATIENT HEALTH QUESTIONNAIRE - PHQ9
SUM OF ALL RESPONSES TO PHQ9 QUESTIONS 1 AND 2: 0
1. LITTLE INTEREST OR PLEASURE IN DOING THINGS: NOT AT ALL
2. FEELING DOWN, DEPRESSED OR HOPELESS: NOT AT ALL

## 2025-07-03 NOTE — PROGRESS NOTES
February 6, 2017      Rasheeda Macias MD  [address]  [address]  [facility] [address]           [facility]  [address]  [facility] [address]  Phone: [phone]          Patient: Samantha Lopez   MR Number: [id number]   YOB: 1983   Date of Visit: 2/6/2017       Dear Dr. Rasheeda Macias:    Thank you for referring Samantha Lopez to me for evaluation. Attached you will find relevant portions of my assessment and plan of care.    If you have questions, please do not hesitate to call me. I look forward to following Samantha Lopez along with you.    Sincerely,    Jose Penn III, MD    Enclosure  CC:  No Recipients    If you would like to receive this communication electronically, please contact [email or web address] or [phone] to request more information on [employer] access.    For providers and/or their staff who would like to refer a patient to [facility], please contact us through our one-stop-shop provider referral line, [facility] , at [phone].    If you feel you have received this communication in error or would no longer like to receive these types of communications, please e-mail [email or web address]          Subjective   Patient ID: Garry Rivas is a 30 y.o. male who presents for Follow-up (6 month follow up. No concerns.).  HPI    Patient presents as a follow up appointment to discuss his blood pressure.      States that since last OV some symptoms have started to improve. Has been able to be more active, Mountain Biking and Skateboarding. Now in the Gym. Tolerating physical activity. Symptoms are not as severe. Does think that he has some element of Anxiety contributing to his symptoms.      Has also been having increased Snoring. Doesn't feel rested at night. Constantly needs to turn on his side. Significant Other kicks him to roll over. Increased napping. Feels tired/fatigued. Sleep study completed. Followed with Sleep Medicine but states that he did not pursue Pap therapy due to the Cost and has not followed back with Dr. Santa. Feels that he is sleeping better.      Has continued on Lisinopril for his Hypertension. Symptoms have been well controlled since last visit.     Review of Systems   Constitutional:  Negative for activity change, appetite change, chills, fatigue, fever and unexpected weight change.   HENT:  Negative for ear pain, hearing loss, nosebleeds, sore throat, tinnitus and trouble swallowing.    Eyes:  Negative for photophobia, pain and visual disturbance.   Respiratory:  Negative for cough, chest tightness, shortness of breath and wheezing.    Cardiovascular:  Negative for chest pain, palpitations and leg swelling.   Gastrointestinal:  Negative for abdominal pain, blood in stool, constipation, diarrhea, nausea and vomiting.   Endocrine: Negative for cold intolerance, heat intolerance, polydipsia and polyuria.   Genitourinary:  Negative for dysuria, flank pain and hematuria.   Musculoskeletal:  Negative for arthralgias, back pain, joint swelling, myalgias and neck pain.   Skin:  Negative for pallor, rash and wound.   Allergic/Immunologic: Negative for immunocompromised state.   Neurological:   Negative for dizziness, seizures and headaches.   Hematological:  Does not bruise/bleed easily.   Psychiatric/Behavioral:  Negative for confusion and sleep disturbance.        Objective   Physical Exam  Vitals and nursing note reviewed.   Constitutional:       General: He is not in acute distress.     Appearance: Normal appearance.   HENT:      Head: Normocephalic.      Nose: Nose normal.   Eyes:      Conjunctiva/sclera: Conjunctivae normal.   Neck:      Vascular: No carotid bruit.   Cardiovascular:      Rate and Rhythm: Normal rate and regular rhythm.      Pulses: Normal pulses.      Heart sounds: Normal heart sounds.   Pulmonary:      Effort: Pulmonary effort is normal.      Breath sounds: Normal breath sounds.   Abdominal:      General: Bowel sounds are normal.      Palpations: Abdomen is soft.   Musculoskeletal:         General: Normal range of motion.      Cervical back: Normal range of motion.   Skin:     General: Skin is warm and dry.   Neurological:      Mental Status: He is alert and oriented to person, place, and time. Mental status is at baseline.   Psychiatric:         Mood and Affect: Mood normal.         Behavior: Behavior normal.         Assessment/Plan         Reviewed results of blood work completed with patient.       Chest Pain, HA, Shortness of breath: ECG done in the office at last OV Indicated NSR with HR controlled. Lab work and imaging completed.   Hypertension: Continue Lisinopril.   Apnea: HSAT indicated Severe Apnea.  Discussed treatment management.  Followed with Sleep medicine.   Hyperlipidemia: Borderline. Lifestyle changes recommended: Diet consisting of low fat foods, lean meats, high fiber, fresh fruits and vegetables. 150 min/ weekly aerobic exercise. Reevaluate with next lab work.   Anxiety: Vistaril PRN. Follow up with medication effectiveness.      Declined updated immunizations.     LUZMA Harrington-CNS 07/03/25 3:20 PM

## 2025-10-28 ENCOUNTER — APPOINTMENT (OUTPATIENT)
Dept: PRIMARY CARE | Facility: CLINIC | Age: 30
End: 2025-10-28
Payer: COMMERCIAL